# Patient Record
Sex: FEMALE | Race: WHITE | URBAN - METROPOLITAN AREA
[De-identification: names, ages, dates, MRNs, and addresses within clinical notes are randomized per-mention and may not be internally consistent; named-entity substitution may affect disease eponyms.]

---

## 2024-06-07 ENCOUNTER — LAB OUTSIDE AN ENCOUNTER (OUTPATIENT)
Dept: URBAN - METROPOLITAN AREA CLINIC 105 | Facility: CLINIC | Age: 34
End: 2024-06-07

## 2024-06-07 ENCOUNTER — OFFICE VISIT (OUTPATIENT)
Dept: URBAN - METROPOLITAN AREA CLINIC 105 | Facility: CLINIC | Age: 34
End: 2024-06-07
Payer: COMMERCIAL

## 2024-06-07 ENCOUNTER — DASHBOARD ENCOUNTERS (OUTPATIENT)
Age: 34
End: 2024-06-07

## 2024-06-07 VITALS
SYSTOLIC BLOOD PRESSURE: 96 MMHG | HEIGHT: 67 IN | BODY MASS INDEX: 18.62 KG/M2 | TEMPERATURE: 97.5 F | HEART RATE: 67 BPM | WEIGHT: 118.6 LBS | DIASTOLIC BLOOD PRESSURE: 64 MMHG

## 2024-06-07 DIAGNOSIS — K62.5 RECTAL BLEEDING: ICD-10-CM

## 2024-06-07 DIAGNOSIS — M46.90 AXIAL SPONDYLOARTHRITIS: ICD-10-CM

## 2024-06-07 DIAGNOSIS — R19.4 CHANGE IN BOWEL HABITS: ICD-10-CM

## 2024-06-07 PROCEDURE — 99204 OFFICE O/P NEW MOD 45 MIN: CPT | Performed by: INTERNAL MEDICINE

## 2024-06-07 RX ORDER — CELECOXIB 200 MG/1
TAKE ONE CAPSULE BY MOUTH TWICE A DAY FOR SHORT TERM USE CAPSULE ORAL
Qty: 90 UNSPECIFIED | Refills: 0 | Status: ACTIVE | COMMUNITY

## 2024-06-07 RX ORDER — CERTOLIZUMAB PEGOL 200 MG/ML
INJECTION, SOLUTION SUBCUTANEOUS
Qty: 1 | Status: ACTIVE | COMMUNITY

## 2024-06-07 RX ORDER — PREDNISONE 10 MG/1
TAKE ONE TABLET BY MOUTH EVERY MORNING WITH BREAKFAST TABLET ORAL
Qty: 90 UNSPECIFIED | Refills: 0 | Status: ACTIVE | COMMUNITY

## 2024-06-07 RX ORDER — POLYETHYLENE GLYCOL-3350 AND ELECTROLYTES WITH FLAVOR PACK 240; 5.84; 2.98; 6.72; 22.72 G/278.26G; G/278.26G; G/278.26G; G/278.26G; G/278.26G
AS DIRECTED POWDER, FOR SOLUTION ORAL 1
Qty: 1 | Refills: 0 | OUTPATIENT
Start: 2024-06-07 | End: 2024-06-08

## 2024-06-07 NOTE — HPI-TODAY'S VISIT:
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  Then improved and recurred again for several weeks.  Says in the past 2 weeks "I actually feel fine" Now she has a BM about 4 to 5 times a week which is her baseline - stools are formed.  When she was having diarrhea would have pain and cramps prior to a BM , improved after a BM and would go 1-3 liquid stools are day. Once had a lot of blood in the stool and others had some stool in tissue wipe. Only lingering symptoms is a little bit of cramping and urgency. No fhx of colon CA. No fhx of UC or Crohns.

## 2024-06-28 ENCOUNTER — OFFICE VISIT (OUTPATIENT)
Dept: URBAN - METROPOLITAN AREA CLINIC 105 | Facility: CLINIC | Age: 34
End: 2024-06-28

## 2024-07-17 ENCOUNTER — OFFICE VISIT (OUTPATIENT)
Dept: URBAN - METROPOLITAN AREA MEDICAL CENTER 33 | Facility: MEDICAL CENTER | Age: 34
End: 2024-07-17

## 2024-07-25 ENCOUNTER — TELEPHONE ENCOUNTER (OUTPATIENT)
Dept: URBAN - METROPOLITAN AREA CLINIC 105 | Facility: CLINIC | Age: 34
End: 2024-07-25

## 2024-09-24 ENCOUNTER — OFFICE VISIT (OUTPATIENT)
Dept: URBAN - METROPOLITAN AREA MEDICAL CENTER 33 | Facility: MEDICAL CENTER | Age: 34
End: 2024-09-24